# Patient Record
Sex: MALE | Race: BLACK OR AFRICAN AMERICAN | Employment: STUDENT | URBAN - METROPOLITAN AREA
[De-identification: names, ages, dates, MRNs, and addresses within clinical notes are randomized per-mention and may not be internally consistent; named-entity substitution may affect disease eponyms.]

---
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
## 2024-10-10 ENCOUNTER — HOSPITAL ENCOUNTER (OUTPATIENT)
Age: 24
Setting detail: SPECIMEN
Discharge: HOME | End: 2024-10-10

## 2024-10-10 LAB — HIV 1+2 AB+HIV1 P24 AG SERPL QL IA: NONREACTIVE

## 2024-10-15 LAB
HSV1 IGG SERPL QL IA: 0.23
HSV1+2 IGM SER QL IA: 0.74
HSV2 AB SER QL IA: 0.01

## 2025-05-17 ENCOUNTER — OFFICE VISIT (OUTPATIENT)
Age: 25
End: 2025-05-17

## 2025-05-17 VITALS
WEIGHT: 185 LBS | HEIGHT: 73 IN | TEMPERATURE: 98.1 F | RESPIRATION RATE: 18 BRPM | OXYGEN SATURATION: 97 % | HEART RATE: 87 BPM | BODY MASS INDEX: 24.52 KG/M2 | SYSTOLIC BLOOD PRESSURE: 112 MMHG | DIASTOLIC BLOOD PRESSURE: 71 MMHG

## 2025-05-17 DIAGNOSIS — S63.259A DISLOCATION OF FINGER, INITIAL ENCOUNTER: Primary | ICD-10-CM

## 2025-05-17 ASSESSMENT — ENCOUNTER SYMPTOMS
SORE THROAT: 0
ABDOMINAL PAIN: 0
RESPIRATORY NEGATIVE: 1
ALLERGIC/IMMUNOLOGIC NEGATIVE: 1
EYES NEGATIVE: 1
RHINORRHEA: 0

## 2025-05-17 NOTE — PROGRESS NOTES
Polina Bejarano (: 2000) is a 25 y.o. male, Established patient, here for evaluation of the following complaint(s): Finger Injury        History provided by:  Patient   used: No    Hand Injury   The incident occurred less than 1 hour ago (Pt. popping and cracking finger joints.). The incident occurred at home. There was no injury mechanism. The pain is present in the right fingers and left fingers. The quality of the pain is described as aching. The pain radiates to the right arm and left hand. The pain is at a severity of 3/10. The pain is mild. The pain has been Intermittent since the incident. The symptoms are aggravated by movement. He has tried nothing for the symptoms.        PAST MEDICAL HISTORY    Past Medical History:   Diagnosis Date    ADHD (attention deficit hyperactivity disorder)     Heart murmur 2014    Mild intermittent asthma without complication 10/27/2016    Vision abnormalities        SURGICAL HISTORY    Past Surgical History:   Procedure Laterality Date    CIRCUMCISION         CURRENT MEDICATIONS    Current Outpatient Rx   Medication Sig Dispense Refill    ibuprofen (ADVIL;MOTRIN) 400 MG tablet take 1 tablet by mouth three times a day with food if needed for pain  0    naproxen (NAPROSYN) 500 MG tablet Take 1 tablet by mouth 2 times daily (with meals) 30 tablet 0    acetaminophen (TYLENOL) 500 MG tablet Take 2 tablets by mouth every 6 hours as needed for Pain 30 tablet 0    mineral oil-hydrophilic petrolatum (HYDROPHOR) ointment Apply topically 4 times daily as needed. 454 g 3    albuterol sulfate HFA (PROAIR HFA) 108 (90 BASE) MCG/ACT inhaler Inhale 2 puffs into the lungs every 6 hours as needed for Wheezing 1 Inhaler 0    Spacer/Aero-Holding Chambers (EASIVENT) MISC use as directed daily WITH INHALER if needed  0       ALLERGIES    No Known Allergies    FAMILY HISTORY    Family History   Problem Relation Age of Onset    Diabetes Mother     Miscarriages /

## 2025-08-24 ENCOUNTER — OFFICE VISIT (OUTPATIENT)
Age: 25
End: 2025-08-24

## 2025-08-24 VITALS
BODY MASS INDEX: 23.86 KG/M2 | TEMPERATURE: 97.3 F | HEART RATE: 54 BPM | HEIGHT: 73 IN | RESPIRATION RATE: 18 BRPM | SYSTOLIC BLOOD PRESSURE: 116 MMHG | DIASTOLIC BLOOD PRESSURE: 67 MMHG | OXYGEN SATURATION: 96 % | WEIGHT: 180 LBS

## 2025-08-24 DIAGNOSIS — R36.9 PENILE DISCHARGE: Primary | ICD-10-CM

## 2025-08-24 DIAGNOSIS — Z20.2 POSSIBLE EXPOSURE TO STD: ICD-10-CM

## 2025-08-24 LAB
BILIRUBIN, URINE, POC: NEGATIVE
BLOOD URINE, POC: NEGATIVE
GLUCOSE URINE, POC: NEGATIVE MG/DL
KETONES, URINE, POC: ABNORMAL MG/DL
LEUKOCYTE ESTERASE, URINE, POC: ABNORMAL
NITRITE, URINE, POC: NEGATIVE
PH, URINE, POC: 5 (ref 4.6–8)
PROTEIN,URINE, POC: ABNORMAL MG/DL
SPECIFIC GRAVITY, URINE, POC: 1.02 (ref 1–1.03)
URINALYSIS CLARITY, POC: CLEAR
URINALYSIS COLOR, POC: YELLOW
UROBILINOGEN, POC: NORMAL MG/DL

## 2025-08-24 RX ORDER — CEFTRIAXONE 500 MG/1
500 INJECTION, POWDER, FOR SOLUTION INTRAMUSCULAR; INTRAVENOUS ONCE
Status: COMPLETED | OUTPATIENT
Start: 2025-08-24 | End: 2025-08-24

## 2025-08-24 RX ADMIN — CEFTRIAXONE 500 MG: 500 INJECTION, POWDER, FOR SOLUTION INTRAMUSCULAR; INTRAVENOUS at 13:05

## 2025-08-27 ENCOUNTER — FOLLOWUP TELEPHONE ENCOUNTER (OUTPATIENT)
Age: 25
End: 2025-08-27